# Patient Record
Sex: FEMALE | Race: BLACK OR AFRICAN AMERICAN | NOT HISPANIC OR LATINO | Employment: FULL TIME | URBAN - METROPOLITAN AREA
[De-identification: names, ages, dates, MRNs, and addresses within clinical notes are randomized per-mention and may not be internally consistent; named-entity substitution may affect disease eponyms.]

---
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
## 2021-05-24 ENCOUNTER — OFFICE VISIT (OUTPATIENT)
Dept: URGENT CARE | Facility: URGENT CARE | Age: 21
End: 2021-05-24
Payer: COMMERCIAL

## 2021-05-24 VITALS
HEART RATE: 106 BPM | DIASTOLIC BLOOD PRESSURE: 63 MMHG | TEMPERATURE: 97.9 F | SYSTOLIC BLOOD PRESSURE: 102 MMHG | OXYGEN SATURATION: 98 % | WEIGHT: 170 LBS | BODY MASS INDEX: 32.07 KG/M2

## 2021-05-24 DIAGNOSIS — R42 FEELING FAINT: Primary | ICD-10-CM

## 2021-05-24 LAB
ANION GAP SERPL CALCULATED.3IONS-SCNC: <1 MMOL/L (ref 3–14)
BASOPHILS # BLD AUTO: 0 10E9/L (ref 0–0.2)
BASOPHILS NFR BLD AUTO: 0.6 %
BUN SERPL-MCNC: 9 MG/DL (ref 7–30)
CALCIUM SERPL-MCNC: 9.3 MG/DL (ref 8.5–10.1)
CHLORIDE SERPL-SCNC: 106 MMOL/L (ref 94–109)
CO2 SERPL-SCNC: 31 MMOL/L (ref 20–32)
CREAT SERPL-MCNC: 0.7 MG/DL (ref 0.52–1.04)
DIFFERENTIAL METHOD BLD: ABNORMAL
EOSINOPHIL # BLD AUTO: 0.2 10E9/L (ref 0–0.7)
EOSINOPHIL NFR BLD AUTO: 3.6 %
ERYTHROCYTE [DISTWIDTH] IN BLOOD BY AUTOMATED COUNT: 14.8 % (ref 10–15)
GFR SERPL CREATININE-BSD FRML MDRD: >90 ML/MIN/{1.73_M2}
GLUCOSE SERPL-MCNC: 104 MG/DL (ref 70–99)
HCT VFR BLD AUTO: 28.3 % (ref 35–47)
HGB BLD-MCNC: 8.4 G/DL (ref 11.7–15.7)
LYMPHOCYTES # BLD AUTO: 2.1 10E9/L (ref 0.8–5.3)
LYMPHOCYTES NFR BLD AUTO: 38.7 %
MCH RBC QN AUTO: 22.9 PG (ref 26.5–33)
MCHC RBC AUTO-ENTMCNC: 29.7 G/DL (ref 31.5–36.5)
MCV RBC AUTO: 77 FL (ref 78–100)
MONOCYTES # BLD AUTO: 0.5 10E9/L (ref 0–1.3)
MONOCYTES NFR BLD AUTO: 8.9 %
NEUTROPHILS # BLD AUTO: 2.6 10E9/L (ref 1.6–8.3)
NEUTROPHILS NFR BLD AUTO: 48.2 %
PLATELET # BLD AUTO: 331 10E9/L (ref 150–450)
POTASSIUM SERPL-SCNC: 3.6 MMOL/L (ref 3.4–5.3)
RBC # BLD AUTO: 3.67 10E12/L (ref 3.8–5.2)
SODIUM SERPL-SCNC: 136 MMOL/L (ref 133–144)
WBC # BLD AUTO: 5.3 10E9/L (ref 4–11)

## 2021-05-24 PROCEDURE — 99214 OFFICE O/P EST MOD 30 MIN: CPT | Performed by: FAMILY MEDICINE

## 2021-05-24 PROCEDURE — 80048 BASIC METABOLIC PNL TOTAL CA: CPT | Performed by: FAMILY MEDICINE

## 2021-05-24 PROCEDURE — 85025 COMPLETE CBC W/AUTO DIFF WBC: CPT | Performed by: FAMILY MEDICINE

## 2021-05-24 PROCEDURE — 93000 ELECTROCARDIOGRAM COMPLETE: CPT | Performed by: FAMILY MEDICINE

## 2021-05-24 PROCEDURE — 36415 COLL VENOUS BLD VENIPUNCTURE: CPT | Performed by: FAMILY MEDICINE

## 2021-05-24 NOTE — PROGRESS NOTES
Assessment & Plan     Feeling Faint/Lightheaded  - CBC with platelets and differential  - Basic metabolic panel  (Ca, Cl, CO2, Creat, Gluc, K, Na, BUN)  - EKG 12-lead complete w/read - Clinics     See AVS for specific instructions regarding iron supplementation and consideration for hormonal contraception to help manage condition of anemia (likely due to blood loss from heavy periods)    Normal mentation during examination, stable vitals. No indication for transfusion at this time.     BMP pending although I now expect this is unlikely to change treatment plan  Murray Ponce MD   [facility]    Myah Adams is a 21 year old female who presents to clinic today for the following health issues:  Chief Complaint   Patient presents with     Urgent Care     Syncope     Pt says she passed out twice today and has been on her period for last 5 months.        HPI  Patient has not had an episode like this in many years when this occurred today about 2 times she was with her sister he got to the point where she felt lightheaded where she had to go to the ground but she did not fully pass out.  She did not injure her head or neck.  At this time she feels fine.  She has a history of anemia although admits that she does not regularly take her iron supplements.  She used to be on birth control but at some point came off this medication she does have heavy periods and does recognize the benefit of may be going back on medication.  She has not had an implantable contraceptive placed before.    Hx of iron deficiency       She also feels like she could have done a better job with staying hydrated especially this warm weather.  She denies any chest pain or heart palpitations.  No known exposures to illness and is not experiencing any cough, fever.    Patient Active Problem List    Diagnosis Date Noted     Obesity, pediatric, BMI 95th to 98th percentile for age 01/12/2016     Priority: Medium     Excessive or  frequent menstruation 08/24/2014     Priority: Medium     Iron deficiency anemia due to chronic blood loss 08/24/2014     Priority: Medium     Vitamin D deficiency 08/24/2014     Priority: Medium     Low, vision, both eyes 08/24/2014     Priority: Medium       Current Outpatient Medications   Medication     docusate sodium (COLACE) 100 MG capsule     ferrous sulfate (FEROSUL) 325 (65 Fe) MG tablet     levonorgestrel-ethinyl estradiol (AVIANE) 0.1-20 MG-MCG tablet     vitamin C (ASCORBIC ACID) 250 MG tablet     No current facility-administered medications for this visit.          Objective    /63   Pulse 106   Temp 97.9  F (36.6  C) (Tympanic)   Wt 77.1 kg (170 lb)   LMP 01/25/2021 (Approximate)   SpO2 98%   Breastfeeding No   BMI 32.07 kg/m    Physical Exam   CV: RRR no m/r/g  Pulm: clear bilaterally  Neuro: no facial droop, normal mentation, no slurred speech  Psych: linear thoughts, good insight, makes eye contact  GEN: NAD       EKG: sinus, rate 77, QTc 391    Results for orders placed or performed in visit on 05/24/21   CBC with platelets and differential     Status: Abnormal   Result Value Ref Range    WBC 5.3 4.0 - 11.0 10e9/L    RBC Count 3.67 (L) 3.8 - 5.2 10e12/L    Hemoglobin 8.4 (L) 11.7 - 15.7 g/dL    Hematocrit 28.3 (L) 35.0 - 47.0 %    MCV 77 (L) 78 - 100 fl    MCH 22.9 (L) 26.5 - 33.0 pg    MCHC 29.7 (L) 31.5 - 36.5 g/dL    RDW 14.8 10.0 - 15.0 %    Platelet Count 331 150 - 450 10e9/L    Diff Method Automated Method     % Neutrophils 48.2 %    % Lymphocytes 38.7 %    % Monocytes 8.9 %    % Eosinophils 3.6 %    % Basophils 0.6 %    Absolute Neutrophil 2.6 1.6 - 8.3 10e9/L    Absolute Lymphocytes 2.1 0.8 - 5.3 10e9/L    Absolute Monocytes 0.5 0.0 - 1.3 10e9/L    Absolute Eosinophils 0.2 0.0 - 0.7 10e9/L    Absolute Basophils 0.0 0.0 - 0.2 10e9/L   Basic metabolic panel  (Ca, Cl, CO2, Creat, Gluc, K, Na, BUN)     Status: Abnormal   Result Value Ref Range    Sodium 136 133 - 144 mmol/L     Potassium 3.6 3.4 - 5.3 mmol/L    Chloride 106 94 - 109 mmol/L    Carbon Dioxide 31 20 - 32 mmol/L    Anion Gap <1 (L) 3 - 14 mmol/L    Glucose 104 (H) 70 - 99 mg/dL    Urea Nitrogen 9 7 - 30 mg/dL    Creatinine 0.70 0.52 - 1.04 mg/dL    GFR Estimate >90 >60 mL/min/[1.73_m2]    GFR Estimate If Black >90 >60 mL/min/[1.73_m2]    Calcium 9.3 8.5 - 10.1 mg/dL                     The use of Dragon/PowerMic dictation services may have been used to construct the content in this note; any grammatical or spelling errors are non-intentional. Please contact the author of this note directly if you are in need of any clarification.

## 2021-05-24 NOTE — PATIENT INSTRUCTIONS
Take iron supplement (60-120mg elemental iron is needed) with vitamin C supplement first thing in the morning on Monday, Wednesday, Friday.   -Do not eat food for at least an hour after taking medications      Make appointment with your primary care physician or see a gynecologist to discuss whether you would benefit from having a Nexplanon device.      Drink 80 to 100 ounces of water a day to stay hydrated.  You may need more fluids if you spend time outdoors or are sweating.      If your symptoms worsen or return please seek medical attention right away

## 2021-09-26 ENCOUNTER — HEALTH MAINTENANCE LETTER (OUTPATIENT)
Age: 21
End: 2021-09-26

## 2022-01-16 ENCOUNTER — HEALTH MAINTENANCE LETTER (OUTPATIENT)
Age: 22
End: 2022-01-16

## 2023-01-14 ENCOUNTER — HEALTH MAINTENANCE LETTER (OUTPATIENT)
Age: 23
End: 2023-01-14

## 2023-04-23 ENCOUNTER — HEALTH MAINTENANCE LETTER (OUTPATIENT)
Age: 23
End: 2023-04-23

## 2023-12-14 ENCOUNTER — APPOINTMENT (OUTPATIENT)
Dept: GENERAL RADIOLOGY | Facility: CLINIC | Age: 23
End: 2023-12-14
Attending: PHYSICIAN ASSISTANT
Payer: COMMERCIAL

## 2023-12-14 ENCOUNTER — HOSPITAL ENCOUNTER (EMERGENCY)
Facility: CLINIC | Age: 23
Discharge: HOME | End: 2023-12-14
Attending: PHYSICIAN ASSISTANT | Admitting: PHYSICIAN ASSISTANT
Payer: COMMERCIAL

## 2023-12-14 VITALS
RESPIRATION RATE: 18 BRPM | BODY MASS INDEX: 32.12 KG/M2 | TEMPERATURE: 97.9 F | HEART RATE: 100 BPM | OXYGEN SATURATION: 100 % | HEIGHT: 61 IN | SYSTOLIC BLOOD PRESSURE: 112 MMHG | DIASTOLIC BLOOD PRESSURE: 71 MMHG

## 2023-12-14 DIAGNOSIS — S52.102A CLOSED FRACTURE OF PROXIMAL END OF LEFT RADIUS, UNSPECIFIED FRACTURE MORPHOLOGY, INITIAL ENCOUNTER: ICD-10-CM
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
## 2023-12-14 DIAGNOSIS — S52.101A CLOSED FRACTURE OF PROXIMAL END OF RIGHT RADIUS, UNSPECIFIED FRACTURE MORPHOLOGY, INITIAL ENCOUNTER: ICD-10-CM

## 2023-12-14 PROCEDURE — 99284 EMERGENCY DEPT VISIT MOD MDM: CPT

## 2023-12-14 PROCEDURE — 29105 APPLICATION LONG ARM SPLINT: CPT | Mod: 50

## 2023-12-14 PROCEDURE — 73080 X-RAY EXAM OF ELBOW: CPT | Mod: 50

## 2023-12-14 PROCEDURE — 73090 X-RAY EXAM OF FOREARM: CPT | Mod: 50

## 2023-12-14 ASSESSMENT — ACTIVITIES OF DAILY LIVING (ADL): ADLS_ACUITY_SCORE: 35

## 2023-12-14 NOTE — LETTER
December 14, 2023      To Whom It May Concern:      Angie Dow was seen in our Emergency Department today, 12/14/23. She has bilateral broken arms and will not able to return to work until she is seen by orthopedics next week.     Sincerely,        Lucina Power RN

## 2023-12-14 NOTE — LETTER
December 14, 2023      To Whom It May Concern:      Angie Dow was seen in our Emergency Department today, 12/14/23.  Patient will need to be off of work until directed otherwise by orthopedics.    Sincerely,        Shahzad Rahman PA-C

## 2023-12-15 NOTE — ED PROVIDER NOTES
Emergency Department Attending Supervision Note  12/14/2023  9:41 PM      I evaluated this patient in conjunction with Shahzad Rahman PA-C and agree with his evaluation, exam, diagnosis, and disposition.      Briefly, the patient presented with pain in her elbows after falling yesterday getting out of her car.  Not strike her head.      On my exam, elbow swelling and pain with any movement.  X-ray shows bilateral radial head fractures.  Long-arm splints were placed.  Patient will follow-up with Ortho.      Diagnosis  Bilateral radial head fractures.      MD Laron Doty Tracy Alan, MD  12/14/23 [id number]

## 2023-12-15 NOTE — DISCHARGE INSTRUCTIONS
For pain, you may take Tylenol 1000mg every 8 hours and ibuprofen 400mg every 6 hours for pain.  Keep splints dry.  Call orthopedics tomorrow to schedule forthcoming appointment.

## 2023-12-15 NOTE — ED TRIAGE NOTES
Pt arrives via triage. Pt fell yesterday getting out of her car, now has bilateral elbow pain. No deformities noted, CMS intact. Denies hitting head.      Triage Assessment (Adult)       Row Name 12/14/23 1932          Triage Assessment    Airway WDL WDL        Respiratory WDL    Respiratory WDL WDL        Skin Circulation/Temperature WDL    Skin Circulation/Temperature WDL WDL        Cardiac WDL    Cardiac WDL WDL        Peripheral/Neurovascular WDL    Peripheral Neurovascular WDL WDL        Cognitive/Neuro/Behavioral WDL    Cognitive/Neuro/Behavioral WDL WDL

## 2023-12-15 NOTE — ED PROVIDER NOTES
"  History     Chief Complaint:  Arm Pain       HPI   Angie Dow is a 23 year old female who presents to the ED with family for evaluation of arm pain.  Patient notes that yesterday she was getting out of her car when she had a mechanical fall forward over her person tripped onto her outstretched arms.  She has had pain to her bilateral elbows and forearms since that time is having trouble extending at the elbow.  She denies any her head, no LOC.  She denies neck pain.  She denies pain elsewhere.  She has not taken anything for her symptoms.      Independent Historian:   None - Patient Only    Review of External Notes:   None    Medications:    docusate sodium (COLACE) 100 MG capsule  ferrous sulfate (FEROSUL) 325 (65 Fe) MG tablet  levonorgestrel-ethinyl estradiol (AVIANE) 0.1-20 MG-MCG tablet  vitamin C (ASCORBIC ACID) 250 MG tablet        Past Medical History:    No past medical history on file.    Past Surgical History:    No past surgical history on file.     Physical Exam   Patient Vitals for the past 24 hrs:   BP Temp Temp src Pulse Resp SpO2 Height   12/14/23 1938 112/71 97.9  F (36.6  C) Oral 100 18 100 % 1.549 m (5' 1\")        Physical Exam  HEENT: mmm  Eyes: PERRL B/L  Neck: Supple  CV: Peripheral pulses intact and regular  Resp: Speaking in full sentences without any respiratory distress  Ext:  Right upper extremity  TTP to proximal forearm. No other bony TTP.  Decreased ability to supinate, pronate, extend at the elbow. Otherwies full ROM.  Sensation and perfusion intact throughout hand    Left upper extremity  TTP to proximal forearm, elbow diffusely. No other bony TTP.  Decreased ability to supinate, pronate, extend at the elbow. Otherwies full ROM.  Sensation and perfusion intact throughout hand  Remainder of the skeletal survey is unremarkable  Skin: warm dry well perfused  Neuro: Alert  Nursing notes and vital signs reviewed.      Emergency Department Course     Imaging:  XR Forearm Bilateral 2 " Views   Final Result   IMPRESSION: Acute impacted fracture of the neck of the radius with a large elbow joint effusion.      XR Elbow Bilateral G/E 3 Views   Final Result   Addendum (preliminary) 1 of 1   Addendum to bilateral elbow 3 views 12/14/2023.      Clarification: There are bilateral elbow joint effusions with fractures of    the necks of both the left and right radiuses.      Final   IMPRESSION: Large elbow joint effusion. Subtle cortical irregularity of the neck of the radius likely an acute impacted fracture.           Procedures     Splint Placement     Procedure: Splint Placement     Indication: Fracture    Consent: Verbal     Location: bilateral elbows    Procedure detail:   Splints were applied by Tech/Nurse with my assistance  Splint type: Long-arm posterior   Splint materilal: Fiberglass  After placement I checked and adjusted the fit as needed to ensure proper positioning/fit   Sensation and circulation are intact after splint placement     Patient Status: The patient tolerated the procedure well: Yes. There were no complications.    Emergency Department Course & Assessments:    Interventions:  Medications - No data to display     Independent Interpretation (X-rays, CTs, rhythm strip):  I personally evaluated the elbow films, obvious fractures noted to bilateral radial heads.    Consultations/Discussion of Management or Tests:  Case discussed with radiology, confirmed it was bilateral fractures.       Social Determinants of Health affecting care:   None    Disposition:  The patient was discharged to home.     Impression & Plan      Medical Decision Making:  Angie Dow is a 23 year old female who presents to the ED for evaluation after what sounds like a FOOSH.  Mechanism is somewhat unclear.  Patient denies hitting head, no LOC.  She has been unable to extend at bilateral elbows.  See HPI as above for additional details.  Vitals and physical exam as above.  Imaging obtained as above suggestive  for fractures to bilateral radial heads unfortunately.  Patient placed in bilateral posterior long-arm splints.  Sling was provided.  Orthopedic referral provided.  Advised Tylenol and ibuprofen for pain.  Felt patient was safe for discharge to home. Discussed reasons to return. All questions answered. Patient discharged to home in stable condition.    Diagnosis:    ICD-10-CM    1. Closed fracture of proximal end of left radius, unspecified fracture morphology, initial encounter  S52.102A       2. Closed fracture of proximal end of right radius, unspecified fracture morphology, initial encounter  S52.101A       3. Fall, initial encounter  W19.XXXA            Discharge Medications:  None    This record was created at least in part using electronic voice recognition software, so please excuse any typographical errors.       Shahzad Rahman PA-C  12/14/23 2345

## 2023-12-18 ENCOUNTER — TRANSFERRED RECORDS (OUTPATIENT)
Dept: HEALTH INFORMATION MANAGEMENT | Facility: CLINIC | Age: 23
End: 2023-12-18
Payer: COMMERCIAL

## 2024-06-30 ENCOUNTER — HEALTH MAINTENANCE LETTER (OUTPATIENT)
Age: 24
End: 2024-06-30

## 2025-07-13 ENCOUNTER — HEALTH MAINTENANCE LETTER (OUTPATIENT)
Age: 25
End: 2025-07-13